# Patient Record
Sex: MALE | Race: WHITE | NOT HISPANIC OR LATINO | URBAN - METROPOLITAN AREA
[De-identification: names, ages, dates, MRNs, and addresses within clinical notes are randomized per-mention and may not be internally consistent; named-entity substitution may affect disease eponyms.]

---

## 2021-04-26 NOTE — PATIENT DISCUSSION
CONJUNCTIVAL BURN OD: EDUCATED PATIENT ON FINDINGS. CORNEA WITH MINIMAL SPK/INVOLVEMENT. PRESCRIBE OTC REFRESH CELLUVISC Q1HR OD AND LOTEMAX NENITA QHS OD. RTC 3 DAYS FOR FOLLOW-UP OR SOONER WITH WORSENING OF SI/SX. MONITOR.

## 2021-04-29 NOTE — PATIENT DISCUSSION
Continue: Lotemax (loteprednol etabonate): ointment: 0.5% a small amount every evening into right eye

## 2021-04-29 NOTE — PATIENT DISCUSSION
SUBCONJUNCTIVAL HEMORRHAGE OD: EDUCATED PATIENT ON FINDINGS AND SELF-LIMITED NATURE OF CONDITION. RECOMMENDED COLD COMPRESS AND OTC ARTIFICIAL TEARS FOR ANY DISCOMFORT. ADVISED TO RTC IF SI/SX PERSIST OR WORSEN. MONITOR.

## 2021-04-29 NOTE — PATIENT DISCUSSION
CONJUNCTIVAL BURN OD: EDUCATED PATIENT ON FINDINGS TODAY. NEARLY RESOLVED WITH ONLY MILD CHEMOSIS REMAINING. CONTINUE LOTEMAX NENITA QHS OD THROUGH SUNDAY THEN DISCONTINUE. CONTINUE PF ARTIFICIAL TEARS QID/PRN OU. ADVISED TO RTC IF SI/SX RETURN. MONITOR.

## 2022-04-12 NOTE — PATIENT DISCUSSION
Discussed r/b of botox injections with the patient.  Patient expressed understanding and wishes to proceed.  Injections were performed without difficulty.  Follow up prn.

## 2022-04-12 NOTE — PROCEDURE NOTE: CLINICAL
PROCEDURE NOTE: Botox, Cosmetic #30 Prior to treatment, the risks/benefits/alternatives were discussed. The patient wished to proceed with procedure. Refer to template for location and amounts of injections. Botox was injected at each site without complications. Patient tolerated procedure well. There were no complications. Post procedure instructions given. Comfort Mendez

## 2022-07-12 ENCOUNTER — ESTABLISHED PATIENT (OUTPATIENT)
Dept: URBAN - METROPOLITAN AREA CLINIC 21 | Facility: CLINIC | Age: 77
End: 2022-07-12

## 2022-07-12 DIAGNOSIS — H43.391: ICD-10-CM

## 2022-07-12 PROCEDURE — 99214 OFFICE O/P EST MOD 30 MIN: CPT

## 2022-07-12 ASSESSMENT — VISUAL ACUITY
OS_SC: 20/20
OD_SC: 20/30

## 2022-07-12 ASSESSMENT — TONOMETRY
OD_IOP_MMHG: 12
OS_IOP_MMHG: 10

## 2022-07-19 ENCOUNTER — ESTABLISHED PATIENT (OUTPATIENT)
Dept: URBAN - METROPOLITAN AREA CLINIC 20 | Facility: CLINIC | Age: 77
End: 2022-07-19

## 2022-07-19 DIAGNOSIS — S05.01XA: ICD-10-CM

## 2022-07-19 PROCEDURE — 99213 OFFICE O/P EST LOW 20 MIN: CPT

## 2022-07-19 RX ORDER — MOXIFLOXACIN OPHTHALMIC 5 MG/ML: 1 SOLUTION/ DROPS OPHTHALMIC

## 2022-07-19 ASSESSMENT — VISUAL ACUITY
OD_SC: 20/60
OS_SC: 20/30

## 2022-07-19 ASSESSMENT — TONOMETRY
OD_IOP_MMHG: 12
OS_IOP_MMHG: 11

## 2022-07-21 ENCOUNTER — FOLLOW UP (OUTPATIENT)
Dept: URBAN - METROPOLITAN AREA CLINIC 21 | Facility: CLINIC | Age: 77
End: 2022-07-21

## 2022-07-21 DIAGNOSIS — S05.01XA: ICD-10-CM

## 2022-07-21 PROCEDURE — 99213 OFFICE O/P EST LOW 20 MIN: CPT

## 2022-07-21 ASSESSMENT — TONOMETRY
OS_IOP_MMHG: 14
OD_IOP_MMHG: 14

## 2022-07-21 ASSESSMENT — VISUAL ACUITY
OD_SC: 20/40
OS_SC: 20/30

## 2022-11-14 ENCOUNTER — ESTABLISHED PATIENT (OUTPATIENT)
Dept: URBAN - METROPOLITAN AREA CLINIC 20 | Facility: CLINIC | Age: 77
End: 2022-11-14

## 2022-11-14 DIAGNOSIS — H35.373: ICD-10-CM

## 2022-11-14 DIAGNOSIS — H25.811: ICD-10-CM

## 2022-11-14 PROCEDURE — 92014 COMPRE OPH EXAM EST PT 1/>: CPT

## 2022-11-14 PROCEDURE — 92134 CPTRZ OPH DX IMG PST SGM RTA: CPT

## 2022-11-14 ASSESSMENT — TONOMETRY
OD_IOP_MMHG: 8
OS_IOP_MMHG: 9

## 2022-11-14 ASSESSMENT — VISUAL ACUITY
OD_SC: 20/30
OU_SC: 20/20
OS_SC: 20/25+3

## 2022-11-14 ASSESSMENT — KERATOMETRY
OD_K2POWER_DIOPTERS: 41.75
OS_K1POWER_DIOPTERS: 41.50
OS_K2POWER_DIOPTERS: 42.00
OD_AXISANGLE2_DEGREES: 59
OS_AXISANGLE_DEGREES: 173
OS_AXISANGLE2_DEGREES: 83
OD_K1POWER_DIOPTERS: 41.50
OD_AXISANGLE_DEGREES: 149

## 2023-05-15 ENCOUNTER — ESTABLISHED PATIENT (OUTPATIENT)
Dept: URBAN - METROPOLITAN AREA CLINIC 20 | Facility: CLINIC | Age: 78
End: 2023-05-15

## 2023-05-15 DIAGNOSIS — H43.811: ICD-10-CM

## 2023-05-15 PROCEDURE — 92014 COMPRE OPH EXAM EST PT 1/>: CPT

## 2023-05-15 ASSESSMENT — TONOMETRY
OD_IOP_MMHG: 9
OS_IOP_MMHG: 8

## 2023-05-15 ASSESSMENT — VISUAL ACUITY
OS_SC: 20/25-1
OU_SC: 20/20-3
OD_SC: 20/30-2

## 2023-05-31 ENCOUNTER — ESTABLISHED PATIENT (OUTPATIENT)
Dept: URBAN - METROPOLITAN AREA CLINIC 20 | Facility: CLINIC | Age: 78
End: 2023-05-31

## 2023-05-31 DIAGNOSIS — H35.373: ICD-10-CM

## 2023-05-31 DIAGNOSIS — H43.811: ICD-10-CM

## 2023-05-31 DIAGNOSIS — H35.363: ICD-10-CM

## 2023-05-31 DIAGNOSIS — H43.391: ICD-10-CM

## 2023-05-31 PROCEDURE — 92014 COMPRE OPH EXAM EST PT 1/>: CPT

## 2023-05-31 PROCEDURE — 92201 OPSCPY EXTND RTA DRAW UNI/BI: CPT

## 2023-05-31 PROCEDURE — 92134 CPTRZ OPH DX IMG PST SGM RTA: CPT

## 2023-05-31 ASSESSMENT — TONOMETRY
OS_IOP_MMHG: 5
OD_IOP_MMHG: 5

## 2023-05-31 ASSESSMENT — VISUAL ACUITY
OD_SC: 20/30-2
OS_SC: 20/20-2

## 2023-06-23 ENCOUNTER — ESTABLISHED PATIENT (OUTPATIENT)
Dept: URBAN - METROPOLITAN AREA CLINIC 20 | Facility: CLINIC | Age: 78
End: 2023-06-23

## 2023-06-23 DIAGNOSIS — H35.373: ICD-10-CM

## 2023-06-23 DIAGNOSIS — H25.811: ICD-10-CM

## 2023-06-23 PROCEDURE — 99214 OFFICE O/P EST MOD 30 MIN: CPT

## 2023-06-23 PROCEDURE — 92134 CPTRZ OPH DX IMG PST SGM RTA: CPT

## 2023-06-23 PROCEDURE — 92136 OPHTHALMIC BIOMETRY: CPT

## 2023-06-23 ASSESSMENT — KERATOMETRY
OD_K1POWER_DIOPTERS: 41.25
OD_K2POWER_DIOPTERS: 41.5
OD_AXISANGLE_DEGREES: 143
OD_K2POWER_DIOPTERS: 41.75
OD_AXISANGLE2_DEGREES: 48
OD_AXISANGLE2_DEGREES: 49
OD_AXISANGLE2_DEGREES: 53
OD_AXISANGLE_DEGREES: 139
OD_AXISANGLE_DEGREES: 138

## 2023-06-23 ASSESSMENT — TONOMETRY
OD_IOP_MMHG: 9
OS_IOP_MMHG: 9

## 2023-06-23 ASSESSMENT — VISUAL ACUITY
OS_SC: 20/20-2
OD_SC: 20/40-1

## 2023-07-13 ENCOUNTER — POST-OP (OUTPATIENT)
Dept: URBAN - METROPOLITAN AREA CLINIC 20 | Facility: CLINIC | Age: 78
End: 2023-07-13

## 2023-07-13 DIAGNOSIS — Z96.1: ICD-10-CM

## 2023-07-13 PROCEDURE — 99024 POSTOP FOLLOW-UP VISIT: CPT

## 2023-07-13 ASSESSMENT — VISUAL ACUITY: OD_SC: 20/20-2

## 2023-07-13 ASSESSMENT — TONOMETRY: OD_IOP_MMHG: 13

## 2023-07-21 ENCOUNTER — POST-OP (OUTPATIENT)
Dept: URBAN - METROPOLITAN AREA CLINIC 20 | Facility: CLINIC | Age: 78
End: 2023-07-21

## 2023-07-21 DIAGNOSIS — Z96.1: ICD-10-CM

## 2023-07-21 PROCEDURE — 99024 POSTOP FOLLOW-UP VISIT: CPT

## 2023-07-21 ASSESSMENT — TONOMETRY
OS_IOP_MMHG: 10
OD_IOP_MMHG: 09

## 2023-07-21 ASSESSMENT — VISUAL ACUITY
OU_SC: 20/25
OD_SC: 20/20
OS_SC: 20/25

## 2023-08-09 ENCOUNTER — ESTABLISHED PATIENT (OUTPATIENT)
Dept: URBAN - METROPOLITAN AREA CLINIC 20 | Facility: CLINIC | Age: 78
End: 2023-08-09

## 2023-08-09 DIAGNOSIS — H35.373: ICD-10-CM

## 2023-08-09 DIAGNOSIS — H35.363: ICD-10-CM

## 2023-08-09 DIAGNOSIS — H43.811: ICD-10-CM

## 2023-08-09 DIAGNOSIS — H43.391: ICD-10-CM

## 2023-08-09 PROCEDURE — 99214 OFFICE O/P EST MOD 30 MIN: CPT

## 2023-08-09 PROCEDURE — 92201 OPSCPY EXTND RTA DRAW UNI/BI: CPT

## 2023-08-09 PROCEDURE — 92134 CPTRZ OPH DX IMG PST SGM RTA: CPT

## 2023-08-09 ASSESSMENT — VISUAL ACUITY
OD_SC: 20/20
OS_SC: 20/20

## 2023-08-09 ASSESSMENT — TONOMETRY
OD_IOP_MMHG: 10
OS_IOP_MMHG: 10

## 2023-08-30 ENCOUNTER — ESTABLISHED PATIENT (OUTPATIENT)
Dept: URBAN - METROPOLITAN AREA CLINIC 20 | Facility: CLINIC | Age: 78
End: 2023-08-30

## 2023-08-30 DIAGNOSIS — H35.363: ICD-10-CM

## 2023-08-30 DIAGNOSIS — Z98.890: ICD-10-CM

## 2023-08-30 DIAGNOSIS — H35.373: ICD-10-CM

## 2023-08-30 PROCEDURE — 99024 POSTOP FOLLOW-UP VISIT: CPT

## 2023-08-30 ASSESSMENT — VISUAL ACUITY
OD_SC: 20/20
OS_SC: 20/25

## 2023-08-30 ASSESSMENT — TONOMETRY
OS_IOP_MMHG: 10
OD_IOP_MMHG: 13

## 2023-09-25 ENCOUNTER — POST-OP (OUTPATIENT)
Dept: URBAN - METROPOLITAN AREA CLINIC 11 | Facility: CLINIC | Age: 78
End: 2023-09-25

## 2023-09-25 DIAGNOSIS — H35.363: ICD-10-CM

## 2023-09-25 DIAGNOSIS — Z98.890: ICD-10-CM

## 2023-09-25 DIAGNOSIS — H35.373: ICD-10-CM

## 2023-09-25 PROCEDURE — 99024 POSTOP FOLLOW-UP VISIT: CPT

## 2023-09-25 PROCEDURE — 92134 CPTRZ OPH DX IMG PST SGM RTA: CPT

## 2023-09-25 ASSESSMENT — VISUAL ACUITY
OS_SC: 20/30
OD_SC: 20/70

## 2023-09-25 ASSESSMENT — TONOMETRY
OS_IOP_MMHG: 13
OD_IOP_MMHG: 13

## 2023-10-25 ENCOUNTER — POST-OP (OUTPATIENT)
Dept: URBAN - METROPOLITAN AREA CLINIC 20 | Facility: CLINIC | Age: 78
End: 2023-10-25

## 2023-10-25 DIAGNOSIS — H35.363: ICD-10-CM

## 2023-10-25 DIAGNOSIS — H35.373: ICD-10-CM

## 2023-10-25 DIAGNOSIS — Z98.890: ICD-10-CM

## 2023-10-25 PROCEDURE — 92134 CPTRZ OPH DX IMG PST SGM RTA: CPT

## 2023-10-25 PROCEDURE — 99024 POSTOP FOLLOW-UP VISIT: CPT

## 2023-10-25 ASSESSMENT — VISUAL ACUITY
OS_SC: 20/25-1
OD_SC: 20/20-1

## 2023-10-25 ASSESSMENT — TONOMETRY
OD_IOP_MMHG: 5
OS_IOP_MMHG: 6

## 2024-04-24 ENCOUNTER — PREPPED CHART (OUTPATIENT)
Dept: URBAN - METROPOLITAN AREA CLINIC 18 | Facility: CLINIC | Age: 79
End: 2024-04-24

## 2024-05-15 ENCOUNTER — ESTABLISHED PATIENT (OUTPATIENT)
Dept: URBAN - METROPOLITAN AREA CLINIC 20 | Facility: CLINIC | Age: 79
End: 2024-05-15

## 2024-05-15 DIAGNOSIS — H35.363: ICD-10-CM

## 2024-05-15 DIAGNOSIS — Z98.890: ICD-10-CM

## 2024-05-15 DIAGNOSIS — H35.373: ICD-10-CM

## 2024-05-15 PROCEDURE — 92134 CPTRZ OPH DX IMG PST SGM RTA: CPT

## 2024-05-15 PROCEDURE — 92014 COMPRE OPH EXAM EST PT 1/>: CPT

## 2024-05-15 ASSESSMENT — VISUAL ACUITY
OS_SC: 20/25-2
OD_SC: 20/20

## 2024-05-15 ASSESSMENT — TONOMETRY
OD_IOP_MMHG: 6
OS_IOP_MMHG: 8

## 2024-11-18 ENCOUNTER — FOLLOW UP (OUTPATIENT)
Dept: URBAN - METROPOLITAN AREA CLINIC 20 | Facility: CLINIC | Age: 79
End: 2024-11-18

## 2024-11-18 DIAGNOSIS — H35.373: ICD-10-CM

## 2024-11-18 DIAGNOSIS — H35.363: ICD-10-CM

## 2024-11-18 DIAGNOSIS — H16.223: ICD-10-CM

## 2024-11-18 PROCEDURE — 92014 COMPRE OPH EXAM EST PT 1/>: CPT

## 2025-07-30 PROBLEM — 62315008: Status: ACTIVE | Noted: 2025-07-30

## 2025-07-30 PROBLEM — 428283002: Status: ACTIVE | Noted: 2025-07-30

## 2025-07-31 ENCOUNTER — DASHBOARD ENCOUNTERS (OUTPATIENT)
Age: 80
End: 2025-07-31

## 2025-07-31 ENCOUNTER — LAB OUTSIDE AN ENCOUNTER (OUTPATIENT)
Dept: URBAN - METROPOLITAN AREA CLINIC 72 | Facility: CLINIC | Age: 80
End: 2025-07-31

## 2025-07-31 ENCOUNTER — OFFICE VISIT (OUTPATIENT)
Dept: URBAN - METROPOLITAN AREA CLINIC 72 | Facility: CLINIC | Age: 80
End: 2025-07-31

## 2025-07-31 RX ORDER — LEVETIRACETAM 250 MG/1
TAKE TWO TABLETS BY MOUTH EVERY MORNING & TAKE ONE TABLET BY MOUTH IN THE EVENING TABLET, FILM COATED ORAL
Qty: 270 UNSPECIFIED | Refills: 0 | Status: ACTIVE | COMMUNITY

## 2025-07-31 RX ORDER — MULTIVITAMIN WITH IRON
AS DIRECTED TABLET ORAL
Status: ACTIVE | COMMUNITY
Start: 2025-07-31

## 2025-07-31 RX ORDER — LISINOPRIL 5 MG/1
TABLET ORAL
Qty: 90 TABLET | Status: ACTIVE | COMMUNITY

## 2025-07-31 RX ORDER — ESCITALOPRAM OXALATE 10 MG/1
1 TABLET TABLET, FILM COATED ORAL ONCE A DAY
Status: ACTIVE | COMMUNITY

## 2025-07-31 RX ORDER — ESCITALOPRAM 10 MG/1
TAKE ONE TABLET BY MOUTH ONE TIME DAILY TABLET, FILM COATED ORAL
Qty: 30 UNSPECIFIED | Refills: 0 | Status: ACTIVE | COMMUNITY

## 2025-07-31 RX ORDER — ROSUVASTATIN CALCIUM 20 MG/1
TABLET ORAL
Qty: 90 TABLET | Status: ACTIVE | COMMUNITY

## 2025-07-31 RX ORDER — DONEPEZIL HYDROCHLORIDE 10 MG/1
TAKE ONE TABLET BY MOUTH EVERY EVENING TABLET, FILM COATED ORAL
Qty: 30 UNSPECIFIED | Refills: 0 | Status: ACTIVE | COMMUNITY

## 2025-07-31 RX ORDER — ASPIRIN 81 MG/1
TAKE ONE TABLET BY MOUTH ONE TIME DAILY TABLET, COATED ORAL
Qty: 30 UNSPECIFIED | Refills: 0 | Status: ACTIVE | COMMUNITY

## 2025-07-31 NOTE — HPI-OTHER HISTORIES
Procedures: 9/6/2022 -colonoscopy: Examined portion of the ileum was normal.  One 5 mm polyp at the hepatic flexure.  2 x 4-6 mm polyps in the splenic flexure.  One 10 mm polyp in the descending colon.  3 x 3-4 mm polyps in the sigmoid colon.  Moderate diverticulosis in the sigmoid colon.  Internal hemorrhoids.  Random biopsies performed for diarrhea.  3/4 tubular adenomas and 1 hyperplastic polyp.  Random biopsies negative.  3-year recall.  Due 9/2025.  6/25/2019-colonoscopy: Multiple colon polyps and diverticulosis.  Path: 4/6 tubular adenomas.  2 sessile serrated polyps.  Recall 3 years.  Due 6/2019.

## 2025-07-31 NOTE — HPI-TODAY'S VISIT:
Patient is a 79-year-old male here for diarrhea.  Previous records received from [facility] gastroenterology.  Patient's last colonoscopy performed 9/6/2022 with a 3-year recall due 9/2025.  Random biopsies at that time done for same issues were negative.  Patient is seen today with his wife. He has underlying Alzheimers. Wife explains that he has had 2 incontinent episodes. They are urgent - Major Scale 7. Patient is stooling every 3-4 days. He states his bowels are firm. He doesnt have to push. Wife states that he may go multiple time daily.